# Patient Record
Sex: MALE | Race: WHITE | NOT HISPANIC OR LATINO | URBAN - METROPOLITAN AREA
[De-identification: names, ages, dates, MRNs, and addresses within clinical notes are randomized per-mention and may not be internally consistent; named-entity substitution may affect disease eponyms.]

---

## 2022-12-30 PROBLEM — R29.818 NEUROLOGICAL DEFICIT PRESENT: Status: ACTIVE | Noted: 2022-12-30

## 2022-12-30 PROBLEM — R26.2 AMBULATORY DYSFUNCTION: Status: ACTIVE | Noted: 2022-12-30

## 2022-12-30 PROBLEM — I63.219 ACUTE ISCHEMIC VERTEBROBASILAR ARTERY BRAINSTEM STROKE: Status: ACTIVE | Noted: 2022-12-30

## 2022-12-30 PROBLEM — Z72.0 TOBACCO ABUSE: Status: ACTIVE | Noted: 2022-12-30

## 2022-12-30 PROBLEM — H53.2 DIPLOPIA: Status: ACTIVE | Noted: 2022-12-30

## 2022-12-30 PROBLEM — I63.22 ACUTE ISCHEMIC VERTEBROBASILAR ARTERY BRAINSTEM STROKE: Status: ACTIVE | Noted: 2022-12-30

## 2023-01-03 ENCOUNTER — TELEPHONE (OUTPATIENT)
Dept: NEUROLOGY | Facility: CLINIC | Age: 58
End: 2023-01-03
Payer: MEDICAID

## 2023-01-03 NOTE — TELEPHONE ENCOUNTER
----- Message from Faustino Faria sent at 1/3/2023 10:52 AM CST -----  [facility] F/u; CVA; [facility];   Contact: AMAURI OCHOA [[id number]]  Type:  Sooner Appointment Request    Caller is requesting a sooner appointment.      Name of Caller:  Summer    When is the first available appointment?  Dept book    Symptoms:  CVA    Best Call Back Number:  [phone]    Additional Information:  Medicaid Pt needs Hosp f/u. Please call to advise.

## 2023-01-24 ENCOUNTER — TELEPHONE (OUTPATIENT)
Dept: NEUROLOGY | Facility: CLINIC | Age: 58
End: 2023-01-24
Payer: MEDICAID

## 2023-01-24 NOTE — TELEPHONE ENCOUNTER
Spoke with patient regarding scheduling an appointment for a Hospital f/u stroke. Offered January 26 at 8 am. Patient states that he will call back tomorrow to see if he can make it for appointment time

## 2023-01-24 NOTE — TELEPHONE ENCOUNTER
----- Message from Chula Thibodeaux sent at 1/24/2023  1:48 PM CST -----  Type:  Sooner Appointment Request    Caller is requesting a sooner appointment.  .    Name of Caller:  pt   When is the first available appointment?  Unk?  Symptoms:  ed f/u .. stroke   Best Call Back Number:  [phone] (home)     Additional Information:  pt was suppose to get a call back about a another appt and never did. Pt wants to be seen asap.  Pt is requesting a appt urgently with the next appt Please advise thank you

## 2023-01-26 ENCOUNTER — OFFICE VISIT (OUTPATIENT)
Dept: NEUROLOGY | Facility: CLINIC | Age: 58
End: 2023-01-26
Payer: MEDICAID

## 2023-01-26 VITALS
HEART RATE: 74 BPM | WEIGHT: 167 LBS | HEIGHT: 66 IN | DIASTOLIC BLOOD PRESSURE: 80 MMHG | BODY MASS INDEX: 26.84 KG/M2 | SYSTOLIC BLOOD PRESSURE: 132 MMHG

## 2023-01-26 DIAGNOSIS — Z86.73 H/O ISCHEMIC VERTEBROBASILAR ARTERY BRAINSTEM STROKE: ICD-10-CM

## 2023-01-26 DIAGNOSIS — E78.5 HYPERLIPIDEMIA, UNSPECIFIED HYPERLIPIDEMIA TYPE: ICD-10-CM

## 2023-01-26 DIAGNOSIS — Z72.0 TOBACCO ABUSE: ICD-10-CM

## 2023-01-26 PROCEDURE — 99215 OFFICE O/P EST HI 40 MIN: CPT | Mod: S$PBB,,, | Performed by: NURSE PRACTITIONER

## 2023-01-26 PROCEDURE — 1160F RVW MEDS BY RX/DR IN RCRD: CPT | Mod: CPTII,,, | Performed by: NURSE PRACTITIONER

## 2023-01-26 PROCEDURE — 3075F SYST BP GE 130 - 139MM HG: CPT | Mod: CPTII,,, | Performed by: NURSE PRACTITIONER

## 2023-01-26 PROCEDURE — 3079F PR MOST RECENT DIASTOLIC BLOOD PRESSURE 80-89 MM HG: ICD-10-PCS | Mod: CPTII,,, | Performed by: NURSE PRACTITIONER

## 2023-01-26 PROCEDURE — 99213 OFFICE O/P EST LOW 20 MIN: CPT | Mod: PBBFAC,PO | Performed by: NURSE PRACTITIONER

## 2023-01-26 PROCEDURE — 1160F PR REVIEW ALL MEDS BY PRESCRIBER/CLIN PHARMACIST DOCUMENTED: ICD-10-PCS | Mod: CPTII,,, | Performed by: NURSE PRACTITIONER

## 2023-01-26 PROCEDURE — 99999 PR PBB SHADOW E&M-EST. PATIENT-LVL III: ICD-10-PCS | Mod: PBBFAC,,, | Performed by: NURSE PRACTITIONER

## 2023-01-26 PROCEDURE — 3008F BODY MASS INDEX DOCD: CPT | Mod: CPTII,,, | Performed by: NURSE PRACTITIONER

## 2023-01-26 PROCEDURE — 99999 PR PBB SHADOW E&M-EST. PATIENT-LVL III: CPT | Mod: PBBFAC,,, | Performed by: NURSE PRACTITIONER

## 2023-01-26 PROCEDURE — 99215 PR OFFICE/OUTPT VISIT, EST, LEVL V, 40-54 MIN: ICD-10-PCS | Mod: S$PBB,,, | Performed by: NURSE PRACTITIONER

## 2023-01-26 PROCEDURE — 3079F DIAST BP 80-89 MM HG: CPT | Mod: CPTII,,, | Performed by: NURSE PRACTITIONER

## 2023-01-26 PROCEDURE — 1159F MED LIST DOCD IN RCRD: CPT | Mod: CPTII,,, | Performed by: NURSE PRACTITIONER

## 2023-01-26 PROCEDURE — 3008F PR BODY MASS INDEX (BMI) DOCUMENTED: ICD-10-PCS | Mod: CPTII,,, | Performed by: NURSE PRACTITIONER

## 2023-01-26 PROCEDURE — 1159F PR MEDICATION LIST DOCUMENTED IN MEDICAL RECORD: ICD-10-PCS | Mod: CPTII,,, | Performed by: NURSE PRACTITIONER

## 2023-01-26 PROCEDURE — 3075F PR MOST RECENT SYSTOLIC BLOOD PRESS GE 130-139MM HG: ICD-10-PCS | Mod: CPTII,,, | Performed by: NURSE PRACTITIONER

## 2023-01-26 RX ORDER — AMOXICILLIN AND CLAVULANATE POTASSIUM 875; 125 MG/1; MG/1
1 TABLET, FILM COATED ORAL 2 TIMES DAILY
COMMUNITY
Start: 2022-06-06

## 2023-01-26 RX ORDER — ALBUTEROL SULFATE 90 UG/1
2 AEROSOL, METERED RESPIRATORY (INHALATION) EVERY 6 HOURS PRN
COMMUNITY
Start: 2022-06-20

## 2023-01-26 RX ORDER — METFORMIN HYDROCHLORIDE 500 MG/1
500 TABLET ORAL NIGHTLY
COMMUNITY
Start: 2022-08-03

## 2023-01-26 RX ORDER — AMOXICILLIN 875 MG/1
875 TABLET, FILM COATED ORAL 2 TIMES DAILY
COMMUNITY
Start: 2022-10-20

## 2023-01-26 RX ORDER — LEVOTHYROXINE SODIUM 50 UG/1
50 TABLET ORAL
COMMUNITY
Start: 2022-09-16

## 2023-01-26 NOTE — PROGRESS NOTES
"  NEUROLOGY  Outpatient Follow Up    [facility]  [address] [facility], [place] [address]  [phone] (office) / [phone] (fax)    Patient Name:  Mukesh Ashraf  :  1965  MR #:  [id number]  Acct #:  [id number]    Date of Neurology Visit: 2023  Name of Provider: BUBBA Schuler    Other Physicians:  Primary Doctor No (Primary Care Physician); Self, Aaareferral (Referring)      Chief Complaint: Stroke      History of Present Illness (HPI):  As per Epic chart review  2022  "This patient Mukesh Ashraf is a 57-year-old gentleman with mild controlled Stage I COPD, nicotine dependence, remote stable Asbestos exposure, left rotator cuff Tendinopathy, Closed fracture of humerus and remote history of left posterior extracranial mass s/p excision, as per patient initially thought to be a cyst however was benign tumor. He presents for evaluation of double vision and disequilibrium that began at approximately at 8:30 a.m. yesterday morning on 2022.  The patient states that he was in his usual state of health, he denies trauma, however he reported seeing objects that appeared crooked to him.  He looked in the mirror earlier today and noticed that his eyes were not straight as they normally are.  In addition he endorses ambulatory dysfunction and problems with proprioception. He denies any history of prior problems with his vision.  He denies any significant medical history initially, however was started on metformin, Lisinopril and a thyroid medication but endorses questionable compliance with these medications.  Denied any other neurological symptoms, no associated eye pain, or headache at this time.  The case was discussed with neurology on-call, suspicion for lacunar/pontine stroke, advised MRI brain, MRA of head and neck, load with antiplatelets and admission for full stroke workup.  I have personally reviewed previous records, then summarized and integrated the " information into the HPI. On presentation patient was afebrile and hemodynamically stable. On physical exam he was noted to have left eye medial palsy, the patient's left eye will not cross the midline medially, however his right eye has full range of motion and he endorses no loss of vision. He did have a mild dysmetria with finger-to-nose testing and ambulatory disequilibrium.  Analysis and review of his routine labs show a mild elevation in TSH and mild hyperglycemia. The direct visualization and independent interpretation of his EKG tracing shows normal sinus rhythm with some PACs, no ST elevation. 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  There is mild-to-moderate mucosal thickening in the paranasal sinuses, Independent visualization, personal review and interpretation of his chest x-ray show no evidence of lobar type consolidation or acute cardiac decompensation. After discussion with the ED physician admission was requested.     Hospital Course:   He was admitted for further eval and treatment of diplopia x 2 days. MRI brain revealed late acute left cerebellar lacunar infarct.  Neurology consulted.  He was started on ASA, plavix, lipitor. Recommend DAPT x 21 days, then ASA monotherapy. LDL elevated. PT/OT/ST consulted per protocol and no therapy needs. His symptoms resolved. He was counseled on importance of risk factor reduction including compliance with medications and tobacco cessation. He will follow-up with neurology and PCP. All questions answered at bedside and he is in agreement with the discharge  "plan."          Interval Hx 1/26/2023:   Patient is new to me  Patient is here today for hospital follow up. He reports upon admit he had severe dysequilibrium and double vision. He states the day he was discharged his symptoms did resolve. He no longer has double vision or dysequilibrium. He has not yet seen his PCP but states he will follow up with them. He is maintained on DAPT and will be completing Plavix after this week. I discussed all neurological testing with the patient at length.               Past Medical, Surgical, Family & Social History:   Reviewed and updated.    Home Medications:     Current Outpatient Medications:     albuterol (PROVENTIL/VENTOLIN HFA) 90 mcg/actuation inhaler, Inhale 2 puffs into the lungs every 6 (six) hours as needed., Disp: , Rfl:     aspirin (ECOTRIN) 81 MG EC tablet, Take 1 tablet (81 mg total) by mouth once daily., Disp: 90 tablet, Rfl: 0    atorvastatin (LIPITOR) 40 MG tablet, Take 1 tablet (40 mg total) by mouth once daily., Disp: 90 tablet, Rfl: 0    clopidogreL (PLAVIX) 75 mg tablet, Take 1 tablet (75 mg total) by mouth once daily. for 21 days, Disp: 21 tablet, Rfl: 0    levothyroxine (SYNTHROID) 50 MCG tablet, Take 50 mcg by mouth., Disp: , Rfl:     amoxicillin (AMOXIL) 875 MG tablet, Take 875 mg by mouth 2 (two) times daily., Disp: , Rfl:     amoxicillin-clavulanate 875-125mg (AUGMENTIN) 875-125 mg per tablet, Take 1 tablet by mouth 2 (two) times daily., Disp: , Rfl:     metFORMIN (GLUCOPHAGE) 500 MG tablet, Take 500 mg by mouth every evening., Disp: , Rfl:     Physical Examination:  /80 (BP Location: Right arm, Patient Position: Sitting, BP Method: Medium (Automatic))   Pulse 74   Ht 5' 6" (1.676 m)   Wt 75.8 kg (167 lb)   BMI 26.95 kg/m²     GENERAL:  General appearance: Well, non-toxic appearing.  No apparent distress.  Neck: supple.    MENTAL STATUS:  Alertness, attention span & concentration: normal.  Language: normal.  Orientation to self, place & time: "  normal.  Memory, recent & remote: normal.  Fund of knowledge: normal.      SPEECH:  Clear and fluent.  Follows complex commands.    CRANIAL NERVES:  Cranial Nerves II-XII were examined.  II - Visual fields: normal.  III, IV, VI: PERRL, EOMI, No ptosis, No nystagmus.  V - Facial sensation: normal.  VII - Face symmetry & mobility: normal.  VIII - Hearing: decreased  IX, X - Palate: mobile & midline.  XI - Shoulder shrug: normal.  XII - Tongue protrusion: normal.      GROSS MOTOR:  Gait & station: non focal; mildly cautious  Tone: normal.  Abnormal movements: none.  Finger-nose & Heel-knee-shin: normal.  Rapid alternating movements: normal.  Pronator drift: normal  Romberg: absent    MUSCLE STRENGTH:   Hand grasp:   - right:5/5   - left:5/5    RIGHT    LEFT   5 Neck Ext. 5   5 Neck Flex 5   5 Deltoids 5   5 Biceps 5   5 Triceps 5   5 Forearm.Pr. 5        5 Iliopsoas flex    5   5 Hip Abduct 5   5 Hip Adduct 5   5 Quads 5   5 Hams 5   5 Dorsiflex 5   5 Plantar Flex 5     REFLEXES:    RIGHT Reflex   LEFT   2+ Biceps 2+   2+ Brachiorad. 2+        2+ Patellar 2+        SENSORY:  Light touch: Normal throughout.            Diagnostic Data Reviewed:     Component      Latest Ref Rng & Units 12/30/2022 12/29/2022   Specimen UA       Urine, Clean Catch    Color, UA      Yellow, Straw, Naomi Yellow    Appearance, UA      Clear Clear    pH, UA      5.0 - 8.0 5.5    Specific Gravity, UA      1.005 - 1.030 1.010    Protein, UA      Negative Negative    Glucose, UA      Negative Negative    Ketones, UA      Negative Negative    Bilirubin (UA)      Negative Negative    Occult Blood UA      Negative Negative    NITRITE UA      Negative Negative    UROBILINOGEN UA      <2.0 EU/dL 0.2    Leukocytes, UA      Negative Negative    PT Lupus Anticoagulant      12.0 - 15.5 sec 13.1    PTT Lupus Anticoagulant      32 - 48 sec 50 (H)    Thrombin Time      14.7 - 19.5 sec 17.2    Reptilase Time      <=21.9 sec Not Applicable    PTT Heparin  Neutralized      32 - 48 sec Not Applicable    PTT-LA Mix      32 - 48 sec 40    PLATELET NEUTRALIZATION APTT      Negative Not Applicable    DRVVT Screen      33 - 44 sec 40    dRVVT Incubated 1:1 Mix      33 - 44 sec Not Applicable    dRVVT Confirm      Negative ratio Not Applicable    Hex Phosph Neut Test      Negative Not Applicable    Lupus Anticoagulant Interpretation       See Note    Cholesterol      120 - 199 mg/dL 263 (H)    Triglycerides      30 - 150 mg/dL 290 (H)    HDL      40 - 75 mg/dL 36 (L)    LDL Cholesterol External      63.0 - 159.0 mg/dL 169.0 (H)    HDL/Cholesterol Ratio      20.0 - 50.0 % 13.7 (L)    Total Cholesterol/HDL Ratio      2.0 - 5.0 7.3 (H)    Non-HDL Cholesterol      mg/dL 227    Hemoglobin A1C External      0.0 - 5.6 % 6.0 (H)    Estimated Avg Glucose      68 - 131 mg/dL 126    FACV Specimen       Whole Blood    Factor V Leiden       Negative    PT PCR Specimen       Whole Blood    Prothrombin Gene Mutation       Negative    D-Dimer      <0.50 ug/mL FEU  <0.27   Homocysteine      4.0 - 16.5 umol/L 7.7    Protein C Activity      83 - 168 % 151    Protein S Activity      66 - 143 % 127      CTH 12/29/2022:  FINDINGS:  Gray-white matter differentiation is within normal limits. Chronic involutional changes are noted.  Chronic white matter microischemic changes are noted.  There is intracranial atherosclerosis.  No acute intracranial hemorrhage, extra-axial fluid collection, hydrocephalus, mass effect, midline shift is noted.  No large vessel territory acute ischemia is identified.  Visualized paranasal sinuses demonstrate scattered mucoperiosteal thickening.  Visualized mastoid air cells are clear.  No acute displaced calvarial fracture is identified.     Impression:     1. No acute intracranial abnormalities identified.         MRI brain 12/30/2022:  FINDINGS:  There is subtle increased diffusion and decreased ADC map signal in the left cerebellum seen on the repeated images due to  motion.  There is associated FLAIR signal abnormality.  This abnormality measures 5 mm.  No hemorrhage.  White matter FLAIR hyperintensities are nonspecific but likely related to small vessel ischemic disease.  Ventricles and sulci are proportionate.     No abnormal extra-axial fluid collections.     Flow voids are maintained in the intracranial arteries and dural venous sinuses.     Skull base and calvarium have a normal signal.     Impression:  1. Late acute lacunar infarct in the left cerebellum with some T2 shine through.    MRI personally reviewed  Acute infarct in the brainstem      MRA brain/neck  12/30/2022:  Impression:  1. Normal MRA of the brain.  2. Less than 30% stenosis of the proximal right ICA per NASCET criteria.  [place] clarification    TTE 12/30/2022  Concentric remodeling and normal systolic function.  The estimated ejection fraction is 55%.  Normal left ventricular diastolic function.  There is no evidence of intracardiac shunting.  Normal right ventricular size with normal right ventricular systolic function.           Assessment and Plan:      Problem List Items Addressed This Visit          Neuro    H/O ischemic vertebrobasilar artery brainstem stroke    Current Assessment & Plan     Patient is a 56 y/o male that presents for hospital follow up.   He presented to the ED with diplopia and dysequilibrium   - symptoms resolved  MRI brain scan with acute ischemic posterior circulation stroke and late acute left cerebellum infarct  MRA brain no vascular etiology   MRA neck with <30% right ICA stenosis  TTE without PFO    Etiology possibly embolic but more likely small vessel due to smoking and uncontrolled risk factors. hyoercoags noted     Aspirin 81 mg daily and plavix 75 mg daily for 21 days, followed by ASA monotherapy thereafter   - pt reports having ~ 4 pills left of Plavix    Control vascular risk factors  - Statin for LDL goal <70; management as per PCP  - BP management as per PCP  -  A1c at goal  - smoking cessation  - diet modification    Stroke education provided               Cardiac/Vascular    Hyperlipidemia    Current Assessment & Plan     Vascular risk factor    Cont statin for LDL goal <70            Other    Tobacco abuse    Current Assessment & Plan     Vascular risk factor  Smoking cessation education provided                            Important to note, also  has no past medical history on file.          The patient will return to clinic as needed    All questions were answered and patient is comfortable with the plan.         Thank you very much for the opportunity to assist in this patient's care.    If you have any questions or concerns, please do not hesitate to contact me at any time.      Sincerely,     BUBBA Schuler  [facility] [place]         I spent a total of 60 minutes on the day of the visit.This includes face to face time and non-face to face time preparing to see the patient (eg, review of tests), Obtaining and/or reviewing separately obtained history, Documenting clinical information in the electronic or other health record, Independently interpreting resultsand communicating results to the patient/family/caregiver, or Care coordination.

## 2023-01-26 NOTE — ASSESSMENT & PLAN NOTE
Patient is a 58 y/o male that presents for hospital follow up.   He presented to the ED with diplopia and dysequilibrium   - symptoms resolved  MRI brain scan with acute ischemic posterior circulation stroke and late acute left cerebellum infarct  MRA brain no vascular etiology   MRA neck with <30% right ICA stenosis  TTE without PFO    Etiology possibly embolic but more likely small vessel due to smoking and uncontrolled risk factors. hyoercoags noted     Aspirin 81 mg daily and plavix 75 mg daily for 21 days, followed by ASA monotherapy thereafter   - pt reports having ~ 4 pills left of Plavix    Control vascular risk factors  - Statin for LDL goal <70; management as per PCP  - BP management as per PCP  - A1c at goal  - smoking cessation  - diet modification    Stroke education provided

## 2023-01-26 NOTE — PATIENT INSTRUCTIONS
You have been diagnosed with a stroke, or with a TIA (transient ischemic attack). Or you have been identified as having a high risk for stroke. During a stroke, blood stops flowing to part of your brain. This can damage areas in the brain that control other parts of the body. Symptoms after a stroke depend on which part of the brain has been affected.      Stroke risk factors  Once youve had a stroke, youre at greater risk for another one. Listed below are some other factors that can increase your risk for a stroke:  High blood pressure  High cholesterol  Cigarette or cigar smoking  Diabetes  Carotid or other artery disease  Atrial fibrillation, atrial flutter, or other heart disease  Not being physically active  Obesity  Certain blood disorders (such as sickle cell anemia)  Excessive alcohol use  Abuse of street drugs  Race  Gender  Family history of stroke  Diet high in salty, fried, or greasy foods    Changes in daily living  Doing your regular tasks may be difficult after youve had a stroke, but you can learn new ways to manage your daily activities. In fact, doing daily activities may help you to regain muscle strength and bring back function to affected limbs. Be patient, give yourself time to adjust, and appreciate the progress you make.    Daily activities  You may be at risk of falling. Make changes to your home to help you walk more easily. A therapist will decide if you need an assistive device to walk safely.  You may need to see an occupational therapist or physical therapist to learn new ways of doing things. For example, you may need to make adjustments when bathing or dressing:    Tips for showering or bathing  Test the water temperature with a hand or foot that was not affected by the stroke.  Use grab bars, a shower seat, a hand-held showerhead, and a long-handled brush.    Tips for getting dressed  Dress while sitting, starting with the affected side or limb.  Wear shirts that pull easily over  your head. Wear pants or skirts with elastic waistbands.  Use zippers with loops attached to the pull tabs.    Lifestyle changes  Take your medicines exactly as directed. Dont skip doses.  Begin an exercise program. Ask your provider how to get started. Also ask how much activity you should try to get on a daily or weekly basis. You can benefit from simple activities such as walking or gardening.  Limit alcohol intake. Men should have no more than 2 alcoholic drinks a day. Women should limit themselves to 1 alcoholic drink per day.  Know your cholesterol level. Follow your providers recommendations about how to keep cholesterol under control.  If you are a smoker, quit now. Join a stop-smoking program to improve your chances of success. Ask your provider about medicines or other methods to help you quit.  Learn stress management techniques to help you deal with stress in your home and work life.    Diet  Your healthcare provider will give you information on dietary changes that you may need to make, based on your situation. Your provider may recommend that you see a registered dietitian for help with diet changes. Changes may include:  Reducing fat and cholesterol intake  Reducing salt (sodium) intake, especially if you have high blood pressure  Eating more fresh vegetables and fruits  Eating more lean proteins, such as fish, poultry, and beans and peas (legumes)  Eating less red meat and processed meats  Using low-fat dairy products  Limiting vegetable oils and nut oils  Limiting sweets and processed foods such as chips, cookies, and baked goods    Follow-up care  Keep your medical appointments. Close follow-up is important to stroke rehabilitation and recovery.  Some medicines require blood tests to check for progress or problems. Keep follow-up appointments for any blood tests ordered by your providers    When to call 911  Call 911 right away if you have any of the following symptoms of stroke:  Weakness,  tingling, or loss of feeling on one side of your face or body  Sudden double vision or trouble seeing in one or both eyes  Sudden trouble talking or slurred speech  Trouble understanding others  Sudden, severe headache  Dizziness, loss of balance, or a sense of falling  Blackouts or seizures      F.A.S.T. is an easy way to remember the signs of stroke. When you see these signs, you know that you need to call 911 fast.  F.A.S.T. stands for:  F is for face drooping. One side of the face is drooping or numb. When the person smiles, the smile is uneven.  A is for arm weakness. One arm is weak or numb. When the person lifts both arms at the same time, one arm may drift downward.  S is for speech difficulty. You may notice slurred speech or trouble speaking. The person can't repeat a simple sentence correctly when asked.  T is for time to call 911. If someone shows any of these symptoms, even if they go away, call 911 immediately. Make note of the time the symptoms first appeared.     © 0169-6179 [employer]. [address] [place] [address], [place] [address]. All rights reserved. This information is not intended as a substitute for professional medical care. Always follow your healthcare professional's instructions.

## 2024-06-24 ENCOUNTER — TELEPHONE (OUTPATIENT)
Dept: NEUROLOGY | Facility: CLINIC | Age: 59
End: 2024-06-24
Payer: MEDICAID

## 2024-06-24 NOTE — TELEPHONE ENCOUNTER
Dr. Humphrey from [facility] requested a call back on his cell phone from Ms. Negin Brayan.  Message was forwarded to Ms. Schmidt and left message for Dr. Humphrey advising Ms. Schmidt was gone for the day and his message was forwarded to her.     Dr. Humphrey called back wanted to speak to MsMiguel ArauojSchmidt regarding hospital notes and PFO closure.